# Patient Record
Sex: MALE | Race: WHITE | Employment: FULL TIME | URBAN - METROPOLITAN AREA
[De-identification: names, ages, dates, MRNs, and addresses within clinical notes are randomized per-mention and may not be internally consistent; named-entity substitution may affect disease eponyms.]

---

## 2018-08-23 ENCOUNTER — HOSPITAL ENCOUNTER (OUTPATIENT)
Age: 63
Discharge: HOME | End: 2018-08-23
Attending: FAMILY MEDICINE
Payer: COMMERCIAL

## 2018-08-23 VITALS
RESPIRATION RATE: 20 BRPM | BODY MASS INDEX: 31.05 KG/M2 | HEART RATE: 62 BPM | TEMPERATURE: 98 F | SYSTOLIC BLOOD PRESSURE: 131 MMHG | WEIGHT: 255 LBS | HEIGHT: 76 IN | OXYGEN SATURATION: 98 % | DIASTOLIC BLOOD PRESSURE: 79 MMHG

## 2018-08-23 DIAGNOSIS — H60.501 ACUTE OTITIS EXTERNA OF RIGHT EAR, UNSPECIFIED TYPE: Primary | ICD-10-CM

## 2018-08-23 PROCEDURE — 99203 OFFICE O/P NEW LOW 30 MIN: CPT

## 2018-08-23 RX ORDER — MULTIVITAMIN
1 CAPSULE ORAL
COMMUNITY
End: 2021-02-16

## 2018-08-23 RX ORDER — CARVEDILOL 12.5 MG/1
TABLET ORAL
Refills: 0 | COMMUNITY
Start: 2018-08-01

## 2018-08-23 RX ORDER — CLOPIDOGREL BISULFATE 75 MG/1
TABLET ORAL
Refills: 3 | COMMUNITY
Start: 2018-08-13 | End: 2021-02-16 | Stop reason: ALTCHOICE

## 2018-08-23 RX ORDER — CLOPIDOGREL BISULFATE 75 MG/1
75 TABLET ORAL
COMMUNITY
Start: 2018-05-09 | End: 2021-02-16 | Stop reason: ALTCHOICE

## 2018-08-23 RX ORDER — ROSUVASTATIN CALCIUM 10 MG/1
10 TABLET, COATED ORAL
COMMUNITY
Start: 2018-02-23

## 2018-08-23 RX ORDER — VALSARTAN 40 MG/1
40 TABLET ORAL
COMMUNITY
Start: 2017-02-10

## 2018-08-23 RX ORDER — VALSARTAN 40 MG/1
TABLET ORAL
Refills: 0 | COMMUNITY
Start: 2018-08-03 | End: 2021-02-16

## 2018-08-23 RX ORDER — NEOMYCIN SULFATE, POLYMYXIN B SULFATE, HYDROCORTISONE 3.5; 10000; 1 MG/ML; [USP'U]/ML; MG/ML
3 SOLUTION/ DROPS AURICULAR (OTIC) 2 TIMES DAILY
Qty: 10 ML | Refills: 0 | Status: SHIPPED | OUTPATIENT
Start: 2018-08-23 | End: 2018-08-30

## 2018-08-23 RX ORDER — ASPIRIN 81 MG/1
81 TABLET ORAL
COMMUNITY
Start: 2016-04-18

## 2018-08-23 RX ORDER — AZITHROMYCIN 250 MG/1
TABLET, FILM COATED ORAL
Qty: 1 PACKAGE | Refills: 0 | Status: SHIPPED | OUTPATIENT
Start: 2018-08-23 | End: 2018-08-28

## 2018-08-23 NOTE — ED PROVIDER NOTES
Patient Seen in: [address]    History   Patient presents with:  Ear Problem Pain (neurosensory)    Stated Complaint: right ear pain    HPI    Patient is a 58 yo with a 2 day h/o right ear pain and pressure.  No fevers an NAD  EARS: right tm nl, canal with mild erythema and narrowing.  No d/c  NOSE: nasal turbinates boggy  THROAT: nl  LUNGS:clear no resp distress, increased upper airway sounds, clear at the bases  SKIN: good skin turgor, no obvious rashes  NECK: supple, no a

## 2019-04-04 ENCOUNTER — OFFICE VISIT (OUTPATIENT)
Dept: PODIATRY CLINIC | Facility: CLINIC | Age: 64
End: 2019-04-04
Payer: COMMERCIAL

## 2019-04-04 DIAGNOSIS — Q82.8 POROKERATOSIS: ICD-10-CM

## 2019-04-04 DIAGNOSIS — L60.0 INGROWN TOENAIL: Primary | ICD-10-CM

## 2019-04-04 DIAGNOSIS — B35.1 ONYCHOMYCOSIS: ICD-10-CM

## 2019-04-04 PROCEDURE — 99203 OFFICE O/P NEW LOW 30 MIN: CPT | Performed by: PODIATRIST

## 2019-04-05 NOTE — PROGRESS NOTES
HPI:    Patient ID: Kushal Wright is a 62year old male. This 28-year-old male presents to the office today having not been seen in many years. He presents with several concerns in reference to both of his feet.   His first complaint is pain associated ingrown toenails. There is a positive family history. I discussed the way to cut these toenails in an effort to avoid irritation and if successful no reason for procedure. If not there is consideration for correction of ingrown toenail.   I did speak of

## 2020-08-25 ENCOUNTER — OFFICE VISIT (OUTPATIENT)
Dept: PODIATRY CLINIC | Facility: CLINIC | Age: 65
End: 2020-08-25
Payer: COMMERCIAL

## 2020-08-25 DIAGNOSIS — M77.9 TENDONITIS: ICD-10-CM

## 2020-08-25 DIAGNOSIS — M72.2 PLANTAR FASCIITIS, BILATERAL: Primary | ICD-10-CM

## 2020-08-25 PROCEDURE — 99213 OFFICE O/P EST LOW 20 MIN: CPT | Performed by: PODIATRIST

## 2020-08-25 PROCEDURE — L3060 FOOT ARCH SUPP LONGITUD/META: HCPCS | Performed by: PODIATRIST

## 2020-08-25 PROCEDURE — 99212 OFFICE O/P EST SF 10 MIN: CPT | Performed by: PODIATRIST

## 2020-08-25 NOTE — PROGRESS NOTES
HPI:    Patient ID: Elvia Kinney is a 60year old male. 58-year-old male presents today having not been seen for a while. Patient's primary complaint is pain associated with both of his feet ankles and lower leg.   I have treated this patient for plant fascia, he has discomfort along the course of the tibialis posterior tendon and its insertion into the lower leg. Clearly the symptoms are consistent with shinsplints. The cause of these concerns are his functional pronated position.   Patient would benef

## 2020-09-08 ENCOUNTER — TELEPHONE (OUTPATIENT)
Dept: PODIATRY CLINIC | Facility: CLINIC | Age: 65
End: 2020-09-08

## 2020-09-08 NOTE — TELEPHONE ENCOUNTER
Due to previous use and the need for long term support because of chronic plantar fasciitis it is medically necessary for this patient to wear a permanent in shoe orthotic device.    Something to that effect, thanks scr

## 2020-09-09 NOTE — TELEPHONE ENCOUNTER
Pt notified that letter sent through [employer] [id number]. He states his insurance will not cover orthotics so he is trying to appeal. He said he has a f/u appt to make orthotics even if he has to pay out of pocket.  I did inform him that he would be required to sign a

## 2020-09-16 ENCOUNTER — OFFICE VISIT (OUTPATIENT)
Dept: PODIATRY CLINIC | Facility: CLINIC | Age: 65
End: 2020-09-16
Payer: COMMERCIAL

## 2020-09-16 ENCOUNTER — TELEPHONE (OUTPATIENT)
Dept: PODIATRY CLINIC | Facility: CLINIC | Age: 65
End: 2020-09-16

## 2020-09-16 VITALS — WEIGHT: 250 LBS | BODY MASS INDEX: 30 KG/M2

## 2020-09-16 DIAGNOSIS — M72.2 PLANTAR FASCIITIS, BILATERAL: Primary | ICD-10-CM

## 2020-09-16 PROCEDURE — 29799 UNLISTED PX CASTING/STRPG: CPT | Performed by: PODIATRIST

## 2020-09-16 PROCEDURE — L3000 FT INSERT UCB BERKELEY SHELL: HCPCS | Performed by: PODIATRIST

## 2020-09-16 NOTE — PROGRESS NOTES
HPI:    Patient ID: Janie Soto is a 60year old male. 67-year-old male presents for long-term orthotic control. Patient states that he has approval for this treatment. An impression was taken of both feet in the subtalar joint neutral position.

## 2020-10-23 ENCOUNTER — OFFICE VISIT (OUTPATIENT)
Dept: PODIATRY CLINIC | Facility: CLINIC | Age: 65
End: 2020-10-23
Payer: COMMERCIAL

## 2020-10-23 DIAGNOSIS — M72.2 PLANTAR FASCIITIS, BILATERAL: Primary | ICD-10-CM

## 2020-10-23 NOTE — PROGRESS NOTES
HPI:    Patient ID: Elvia Kinney is a 60year old male. 56-year-old male presents for dispensing of orthoses. They appear to be of proper fit. I do not anticipate areas of irritation. Gradual break-in was recommended.   He has had a device in the p

## 2021-01-20 ENCOUNTER — IMMUNIZATION (OUTPATIENT)
Dept: LAB | Facility: HOSPITAL | Age: 66
End: 2021-01-20
Attending: EMERGENCY MEDICINE
Payer: COMMERCIAL

## 2021-01-20 DIAGNOSIS — Z23 NEED FOR VACCINATION: Primary | ICD-10-CM

## 2021-01-20 PROCEDURE — 0011A SARSCOV2 VAC 100MCG/0.5ML IM: CPT

## 2021-02-15 ENCOUNTER — PATIENT MESSAGE (OUTPATIENT)
Dept: PODIATRY CLINIC | Facility: CLINIC | Age: 66
End: 2021-02-15

## 2021-02-15 NOTE — TELEPHONE ENCOUNTER
From: Laura Metzger  To: Jin Johnson DPM  Sent: 2/15/2021 5:04 PM CST  Subject: Visit Follow-up Question    Hi   Can I re-schedule for later in the day? Thanks m only in town this week and wanted to get in to see Dr Raza Robledo.

## 2021-02-16 ENCOUNTER — OFFICE VISIT (OUTPATIENT)
Dept: PODIATRY CLINIC | Facility: CLINIC | Age: 66
End: 2021-02-16
Payer: COMMERCIAL

## 2021-02-16 DIAGNOSIS — Q82.8 POROKERATOSIS: Primary | ICD-10-CM

## 2021-02-16 PROCEDURE — 99212 OFFICE O/P EST SF 10 MIN: CPT | Performed by: PODIATRIST

## 2021-02-16 RX ORDER — DEXTROAMPHETAMINE SACCHARATE, AMPHETAMINE ASPARTATE, DEXTROAMPHETAMINE SULFATE AND AMPHETAMINE SULFATE 2.5; 2.5; 2.5; 2.5 MG/1; MG/1; MG/1; MG/1
1 TABLET ORAL DAILY
COMMUNITY
Start: 2021-02-14

## 2021-02-16 RX ORDER — EZETIMIBE 10 MG/1
10 TABLET ORAL DAILY
COMMUNITY
Start: 2021-02-14

## 2021-02-16 NOTE — PROGRESS NOTES
HPI:    Patient ID: Missael Hanson is a 60year old male. 67-year-old male presents to the office today with significant recurrent pain in the left forefoot. He states that the callus that I cared for in the past is a recurrent condition.   It bothers hi

## 2021-02-17 ENCOUNTER — IMMUNIZATION (OUTPATIENT)
Dept: LAB | Facility: HOSPITAL | Age: 66
End: 2021-02-17
Attending: EMERGENCY MEDICINE
Payer: COMMERCIAL

## 2021-02-17 DIAGNOSIS — Z23 NEED FOR VACCINATION: Primary | ICD-10-CM

## 2021-02-17 PROCEDURE — 0012A SARSCOV2 VAC 100MCG/0.5ML IM: CPT

## 2024-05-25 NOTE — TELEPHONE ENCOUNTER
Called and left pt a VM to inform them orthotics are ready to be picked up. When pt calls back please schedule an appt with  at [facility] to do so. Thank You.
Noted.  Thank you.
Orthotic fitting done in today's (9/16/2020) office visit.
No

## (undated) NOTE — LETTER
9/9/2020          To Whom It May Concern:    Marianela Caceres is currently under my medical care .   Due to previous use and the need for long term support because of chronic plantar fasciitis it is medically necessary for this patient to wear a permanent in